# Patient Record
Sex: MALE | Race: BLACK OR AFRICAN AMERICAN | Employment: UNEMPLOYED
[De-identification: names, ages, dates, MRNs, and addresses within clinical notes are randomized per-mention and may not be internally consistent; named-entity substitution may affect disease eponyms.]

---

## 2024-03-24 PROBLEM — J85.1 PNEUMONIA WITH LUNG ABSCESS (HCC): Status: ACTIVE | Noted: 2024-03-24

## 2024-05-15 ENCOUNTER — HOSPITAL ENCOUNTER (OUTPATIENT)
Facility: HOSPITAL | Age: 59
Setting detail: RECURRING SERIES
Discharge: HOME | End: 2024-05-18
Payer: COMMERCIAL

## 2024-05-15 PROCEDURE — 97166 OT EVAL MOD COMPLEX 45 MIN: CPT

## 2024-05-15 PROCEDURE — 97535 SELF CARE MNGMENT TRAINING: CPT

## 2024-05-15 PROCEDURE — 97110 THERAPEUTIC EXERCISES: CPT

## 2024-05-15 NOTE — PROGRESS NOTES
[facility] [place]     Plan of Care/Statement of Necessity for Occupational Therapy Services        Patient name: Deepa Quiles Start of Care: 5/15/2024   Referral source: Tiago Beal, APR* : 1965    Medical Diagnosis: Pain in left hand, Pain in right hand    Onset Date: 3/1/24   Treatment Diagnosis: Pain in left hand [M79.642]  Pain in right hand [M79.641]                                                 Prior Hospitalization: see medical history Provider#: [id number]   Medications: Verified on Patient Summary List   Insurance: Payor: [employer] / Plan: [employer] [place] / Product Type: *No Product type* /       Comorbidities: Pneumonia, CHF   Prior Level of Function: Pt had no previous limitations     The Plan of Care and following information is based on the information from the initial evaluation.  Assessment/ key information: Pt presents with bilateral upper extremity weakness following bout with pneumonia and CHF resulting in overall weakness and decreased function. Pt presents with bilateral shoulder weakness, wrist weakness, decreased  and pinch strength. Pt educated on prognosis, exercises to begin strengthening, and activity modifications to help with endurance. Pt is a great candidate for skilled OT services to regain functional pain free use of bilateral arms in hands to perform ADL/IADL activities     Evaluation Complexity:  History:  MEDIUM  Complexity : 1-2 comorbidities / personal factors will impact the outcome/ POC ; Examination:  MEDIUM Complexity : 3 Standardized tests and measures addressin body structure, function, activity limitation and / or participation in recreation  ;Presentation:  MEDIUM Complexity : Evolving with changing characteristics  ;Clinical Decision Making:  FOTO:   = MEDIUM Complexity : FOTO score of 26-74 FOTO score = an established functional score where 100 = no disability  Overall Complexity Rating: MEDIUM    Problem List: Pain effecting function,

## 2024-05-15 NOTE — PROGRESS NOTES
OT DAILY TREATMENT NOTE/HAND EVAL 10-18    Patient Name: Deepa Quiles    Date: 5/15/2024    : 1965  Insurance: Payor: [employer] / Plan: [employer] [place] / Product Type: *No Product type* /      Patient  verified yes     Visit #   Current / Total 1 12   Time   In / Out 940 1020   Pain   In / Out 0 0   Subjective Functional Status/Changes: See history below    Changes to:  Meds, Allergies, Med Hx, Sx Hx?  If yes, update Summary List no       TREATMENT AREA =  Pain in left hand [M79.642]  Pain in right hand [M79.641]    SUBJECTIVE  Pain Level (0-10 scale): 0  []constant []intermittent [x]improving []worsening []no change since onset    Any medication changes, allergies to medications, adverse drug reactions, diagnosis change, or new procedure performed?: [x] No    [] Yes (see summary sheet for update)  Subjective functional status/changes:     PLOF: Pt had no previous limitations; pt is a right hand dominant  Limitations to PLOF: ***  Mechanism of Injury: ***  Current symptoms/Complaints: ***  Previous Treatment/Compliance: ***  PMHx/Surgical Hx: ***  Work Hx: Pt works as forklift operative, 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 (see flow sheet as applicable)     Details if applicable:       31080 Self Care/Home Management (timed):  improve patient knowledge and understanding of pain reducing techniques, positioning, and posture/ergonomics  to improve patient's ability to

## 2024-05-22 ENCOUNTER — HOSPITAL ENCOUNTER (OUTPATIENT)
Facility: HOSPITAL | Age: 59
Setting detail: RECURRING SERIES
Discharge: HOME | End: 2024-05-25
Payer: COMMERCIAL

## 2024-05-22 PROCEDURE — 97110 THERAPEUTIC EXERCISES: CPT

## 2024-05-22 PROCEDURE — 97530 THERAPEUTIC ACTIVITIES: CPT

## 2024-05-22 PROCEDURE — 97112 NEUROMUSCULAR REEDUCATION: CPT

## 2024-05-22 PROCEDURE — 97535 SELF CARE MNGMENT TRAINING: CPT

## 2024-05-22 NOTE — PROGRESS NOTES
PHYSICAL / OCCUPATIONAL THERAPY - DAILY TREATMENT NOTE     Patient Name: Deepa Quiles    Date: 2024    : 1965  Insurance: Payor: [employer] / Plan: [employer] [place] / Product Type: *No Product type* /      Patient  verified Yes     Visit #   Current / Total 2 12   Time   In / Out 730 830    Pain   In / Out 3 3   Subjective Functional Status/Changes: \"I got some good exercise\"    Changes to:  Allergies, Med Hx, Sx Hx?   no       TREATMENT AREA =  Pain in left hand [M79.642]  Pain in right hand [M79.641]    OBJECTIVE        Therapeutic Procedures:  Tx Min Billable or 1:1 Min (if diff from Tx Min) Procedure, Rationale, Specifics   15  71227 Therapeutic Exercise (timed):  increase ROM, strength, coordination, balance, and proprioception to improve patient's ability to progress to PLOF and address remaining functional goals. (see flow sheet as applicable)    Details if applicable:       15  42537 Therapeutic Activity (timed):  use of dynamic activities replicating functional movements to increase ROM, strength, coordination, balance, and proprioception in order to improve patient's ability to progress to PLOF and address remaining functional goals.  (see flow sheet as applicable)    Details if applicable:     15  34959 Neuromuscular Re-Education (timed):  improve balance, coordination, kinesthetic sense, posture, core stability and proprioception to improve patient's ability to develop conscious control of individual muscles and awareness of position of extremities in order to progress to PLOF and address remaining functional goals. (see flow sheet as applicable)     Details if applicable:     15  52024 Self Care/Home Management (timed):  improve patient knowledge and understanding of positioning, posture/ergonomics, and activity modification  to improve patient's ability to progress to PLOF and address remaining functional goals.  (see flow sheet as applicable)    Details if applicable:          60  [facility] Totals

## 2024-06-05 ENCOUNTER — HOSPITAL ENCOUNTER (OUTPATIENT)
Facility: HOSPITAL | Age: 59
Setting detail: RECURRING SERIES
Discharge: HOME | End: 2024-06-08
Payer: COMMERCIAL

## 2024-06-05 PROCEDURE — 97530 THERAPEUTIC ACTIVITIES: CPT

## 2024-06-05 PROCEDURE — 97110 THERAPEUTIC EXERCISES: CPT

## 2024-06-05 NOTE — PROGRESS NOTES
warmup exercise on the arm ergometer. Followed with shoulder thera band resistance exercises in extension, adduction, and external/internal rotations. Continued with serratus wall slides, bicep/eccentric curls, flexbar pronation and supination exercises. Pt continues to progress with therapy and goals.    Patient will continue to benefit from skilled PT / OT services to modify and progress therapeutic interventions, analyze and address functional mobility deficits, analyze and address ROM deficits, analyze and address strength deficits, and analyze and cue for proper movement patterns to address functional deficits and attain remaining goals.    Progress toward goals / Updated goals:  []  See Progress Note/Recertification    STG:  3 Weeks   Pt will be able to demonstrate HEP with visual cues only and correct form  to improve knowledge and carry over of exercises and improve overall outcome   Eval status: educated upon evaluation and given handout   2. Pt will be able to improve bilateral  strength by 20# to improve functional /use of the hand/arms.   Eval status. Right: 60#   Left: 45#   3. Pt will be able to improve 3pt and 2pt pinch strength to over 10# in pain free range to   Eval status Right 3pt  4#   right 2pt: 6#     Left: 3pt  5#    Left 2pt 6#   4. Pt will be able to improve bilateral shoulder strength to 4+/5 levels to improve functional use and strength of the UE   Eval status:               Right            Left  Flex      3/5                3+/5   Abd        3/5            3+/5   ER        3/5            3+/5     IR         5/5           5/5  Ext            5/5        3/5      LT weeks   Pt will be able to stack 1-8 pound weights at shoulder height consecutively with no drops and no rest breaks on bilateral hands to improve functional endurance.   Eval status: Unable to perform   Pt will be able to improve bilateral wrist and forearm strength to 4+/5 levels to improve functional strength

## 2024-06-10 ENCOUNTER — HOSPITAL ENCOUNTER (OUTPATIENT)
Facility: HOSPITAL | Age: 59
Setting detail: RECURRING SERIES
Discharge: HOME | End: 2024-06-13
Payer: COMMERCIAL

## 2024-06-10 PROCEDURE — 97110 THERAPEUTIC EXERCISES: CPT

## 2024-06-10 PROCEDURE — 97530 THERAPEUTIC ACTIVITIES: CPT

## 2024-06-10 NOTE — PROGRESS NOTES
PHYSICAL / OCCUPATIONAL THERAPY - DAILY TREATMENT NOTE     Patient Name: Deepa Quiles    Date: 6/10/2024    : 1965  Insurance: Payor: [employer] / Plan: [employer] [place] / Product Type: *No Product type* /      Patient  verified Yes    Visit #   Current / Total 4 12   Time   In / Out 9:10 am 10:10 am   Pain   In / Out 1  1     Subjective Functional Status/Changes: \"My wrist been a little stiff past week and I was practicing DJ \"   Changes to:  Allergies, Med Hx, Sx Hx?    No       TREATMENT AREA =  Pain in left hand [M79.642]  Pain in right hand [M79.641]    OBJECTIVE      Therapeutic Procedures:  Tx Min Billable or 1:1 Min (if diff from Tx Min) Procedure, Rationale, Specifics   30 10 88530 Therapeutic Exercise (timed):  increase ROM, strength, coordination, balance, and proprioception to improve patient's ability to progress to PLOF and address remaining functional goals. (see flow sheet as applicable)    Details if applicable:       30 30 73260 Therapeutic Activity (timed):  use of dynamic activities replicating functional movements to increase ROM, strength, coordination, balance, and proprioception in order to improve patient's ability to progress to PLOF and address remaining functional goals.  (see flow sheet as applicable)    Details if applicable:                    60 40 [facility] Totals Reminder: bill using total billable min of TIMED therapeutic procedures (example: do not include dry needle or estim unattended, both untimed codes, in totals to left)  8-22 min = 1 unit; 23-37 min = 2 units; 38-52 min = 3 units; 53-67 min = 4 units; 68-82 min = 5 units   Total Total     TOTAL TREATMENT TIME:        60     [x]  Patient Education billed concurrently with other procedures   [x] Review HEP    [] Progressed/Changed HEP, detail:    [] Other detail:       Objective Information/Functional Measures/Assessment    Pt presents with mild 1/10 stiffness and pain in both wrists. Pt performed  and pinch strengthening

## 2024-06-12 ENCOUNTER — HOSPITAL ENCOUNTER (OUTPATIENT)
Facility: HOSPITAL | Age: 59
Setting detail: RECURRING SERIES
Discharge: HOME | End: 2024-06-15
Payer: COMMERCIAL

## 2024-06-12 PROCEDURE — 97110 THERAPEUTIC EXERCISES: CPT

## 2024-06-12 PROCEDURE — 97530 THERAPEUTIC ACTIVITIES: CPT

## 2024-06-12 NOTE — PROGRESS NOTES
[facility]  [address] [place] [address]  Ph [phone]  Fx [phone]    Occupational Therapy Progress Note  Patient name: Deepa Quiles Start of Care: 5/15/2024   Referral source: Tiago Beal, APR* : 1965   Medical/Treatment Diagnosis: Pain in left hand [M79.642]  Pain in right hand [M79.641] Onset Date:3/1/2024   Prior Hospitalization: see medical history Provider#: [id number]   Medications: Verified on Patient Summary List    Comorbidities: Pneumonia, CHF  Prior Level of Function:Pt has no previous limitations    Visits from Start of Care: 5    Missed Visits: 0    Goals/Measure of Progress:    STG:  3 Weeks   Pt will be able to demonstrate HEP with visual cues only and correct form  to improve knowledge and carry over of exercises and improve overall outcome   Eval status: educated upon evaluation and given handout     2. Pt will be able to improve bilateral  strength by 20# to improve functional /use of the hand/arms.   Eval status. Right: 60#   Left: 45#   24:  Right 50#      Left: 56#    3. Pt will be able to improve 3pt and 2pt pinch strength to over 10# in pain free range to   Eval status Right 3pt  4#   right 2pt: 6#     Left: 3pt  5#    Left 2pt 6#   24: Right 3 pt 12 #   Right 2 pt 10#     Left 3 pt 14#   Left 2 pt 10#    4. Pt will be able to improve bilateral shoulder strength to 4+/5 levels to improve functional use and strength of the UE   Eval status:               Right            Left  Flex      3/5                3+/5   Abd        3/5            3+/5   ER        3/5            3+/5     IR         5/5           5/5  Ext            5/5        3/5     24                Right            Left  Flex      4+/5                4/5   Abd        4+/5            4/5   ER        4+/5            3+/5     IR         4+/5           4/5  Ext            4+/5        4/5        LT weeks   Pt will be able to stack 1-8 pound weights at

## 2024-06-12 NOTE — PROGRESS NOTES
PHYSICAL / OCCUPATIONAL THERAPY - DAILY TREATMENT NOTE     Patient Name: Deepa Quiles    Date: 2024    : 1965  Insurance: Payor: JANNET / Plan: [employer] [place] / Product Type: *No Product type* /      Patient  verified Yes     Visit #   Current / Total 5 12   Time   In / Out 8:35 am 9:13 am    Pain   In / Out 0 0   Subjective Functional Status/Changes: I definitely feel like I am getting better   Changes to:  Allergies, Med Hx, Sx Hx?    No     TREATMENT AREA =  Pain in left hand [M79.642]  Pain in right hand [M79.641]    OBJECTIVE    Therapeutic Procedures:  Tx Min Billable or 1:1 Min (if diff from Tx Min) Procedure, Rationale, Specifics   25  46274 Therapeutic Exercise (timed):  increase ROM, strength, coordination, balance, and proprioception to improve patient's ability to progress to PLOF and address remaining functional goals. (see flow sheet as applicable)    Details if applicable:       13  03138 Therapeutic Activity (timed):  use of dynamic activities replicating functional movements to increase ROM, strength, coordination, balance, and proprioception in order to improve patient's ability to progress to PLOF and address remaining functional goals.  (see flow sheet as applicable)    Details if applicable:                    38 min  [facility] Totals Reminder: bill using total billable min of TIMED therapeutic procedures (example: do not include dry needle or estim unattended, both untimed codes, in totals to left)  8-22 min = 1 unit; 23-37 min = 2 units; 38-52 min = 3 units; 53-67 min = 4 units; 68-82 min = 5 units   Total Total     TOTAL TREATMENT TIME:        38 min     [x]  Patient Education billed concurrently with other procedures   [x] Review HEP    [] Progressed/Changed HEP, detail:    [] Other detail:       Objective Information/Functional Measures/Assessment    Pt presents with no pain prior to therapy. Goals were reassessed and seen improvements in , 2pt, 3 pt, and shoulder/wrist MMT. Pt

## 2024-06-19 ENCOUNTER — HOSPITAL ENCOUNTER (OUTPATIENT)
Facility: HOSPITAL | Age: 59
Setting detail: RECURRING SERIES
Discharge: HOME | End: 2024-06-22
Payer: COMMERCIAL

## 2024-06-19 PROCEDURE — 97530 THERAPEUTIC ACTIVITIES: CPT

## 2024-06-19 PROCEDURE — 97112 NEUROMUSCULAR REEDUCATION: CPT

## 2024-06-19 PROCEDURE — 97110 THERAPEUTIC EXERCISES: CPT

## 2024-06-19 NOTE — PROGRESS NOTES
PHYSICAL / OCCUPATIONAL THERAPY - DAILY TREATMENT NOTE     Patient Name: Deepa Quiles    Date: 2024    : 1965  Insurance: Payor: JANNET / Plan: [employer] [place] / Product Type: *No Product type* /      Patient  verified Yes     Visit #   Current / Total 6 12   Time   In / Out 7:52am 8:50 am   Pain   In / Out 0 0   Subjective Functional Status/Changes: \"I have been feeling pretty good\"   Changes to:  Allergies, Med Hx, Sx Hx?     No     TREATMENT AREA =  Pain in left hand [M79.642]  Pain in right hand [M79.641]    OBJECTIVE      Therapeutic Procedures:  Tx Min Billable or 1:1 Min (if diff from Tx Min) Procedure, Rationale, Specifics   25  60548 Therapeutic Exercise (timed):  increase ROM, strength, coordination, balance, and proprioception to improve patient's ability to progress to PLOF and address remaining functional goals. (see flow sheet as applicable)    Details if applicable:       20  95181 Therapeutic Activity (timed):  use of dynamic activities replicating functional movements to increase ROM, strength, coordination, balance, and proprioception in order to improve patient's ability to progress to PLOF and address remaining functional goals.  (see flow sheet as applicable)    Details if applicable:     13  73411 Neuromuscular Re-Education (timed):  improve balance, coordination, kinesthetic sense, posture, core stability and proprioception to improve patient's ability to develop conscious control of individual muscles and awareness of position of extremities in order to progress to PLOF and address remaining functional goals. (see flow sheet as applicable)     Details if applicable:               58  [facility] Totals Reminder: bill using total billable min of TIMED therapeutic procedures (example: do not include dry needle or estim unattended, both untimed codes, in totals to left)  8-22 min = 1 unit; 23-37 min = 2 units; 38-52 min = 3 units; 53-67 min = 4 units; 68-82 min = 5 units   Total Total

## 2024-06-26 ENCOUNTER — HOSPITAL ENCOUNTER (OUTPATIENT)
Facility: HOSPITAL | Age: 59
Setting detail: RECURRING SERIES
Discharge: HOME | End: 2024-06-29
Payer: COMMERCIAL

## 2024-06-26 PROCEDURE — 97110 THERAPEUTIC EXERCISES: CPT

## 2024-06-26 PROCEDURE — 97112 NEUROMUSCULAR REEDUCATION: CPT

## 2024-06-26 PROCEDURE — 97530 THERAPEUTIC ACTIVITIES: CPT

## 2024-06-26 NOTE — PROGRESS NOTES
PHYSICAL / OCCUPATIONAL THERAPY - DAILY TREATMENT NOTE     Patient Name: Deepa Quiles    Date: 2024    : 1965  Insurance: Payor: JANNET / [place]: [employer] [place] / Product Type: *No Product type* /      Patient  verified Yes   Visit #   Current / Total 7 12   Time   In / Out 7:00am 7:40 am   Pain   In / Out 0 0   Subjective Functional Status/Changes: I am doing great   Changes to:  Allergies, Med Hx, Sx Hx?    No       TREATMENT AREA =  Pain in left hand [M79.642]  Pain in right hand [M79.641]    OBJECTIVE    Therapeutic Procedures:  Tx Min Billable or 1:1 Min (if diff from Tx Min) Procedure, Rationale, Specifics   20  07442 Therapeutic Exercise (timed):  increase ROM, strength, coordination, balance, and proprioception to improve patient's ability to progress to PLOF and address remaining functional goals. (see flow sheet as applicable)    Details if applicable:       10  54584 Therapeutic Activity (timed):  use of dynamic activities replicating functional movements to increase ROM, strength, coordination, balance, and proprioception in order to improve patient's ability to progress to PLOF and address remaining functional goals.  (see flow sheet as applicable)    Details if applicable:     10  57325 Neuromuscular Re-Education (timed):  improve balance, coordination, kinesthetic sense, posture, core stability and proprioception to improve patient's ability to develop conscious control of individual muscles and awareness of position of extremities in order to progress to PLOF and address remaining functional goals. (see flow sheet as applicable)     Details if applicable:               40  [facility] Totals Reminder: bill using total billable min of TIMED therapeutic procedures (example: do not include dry needle or estim unattended, both untimed codes, in totals to left)  8-22 min = 1 unit; 23-37 min = 2 units; 38-52 min = 3 units; 53-67 min = 4 units; 68-82 min = 5 units   Total Total     TOTAL TREATMENT TIME:

## 2024-07-17 ENCOUNTER — TELEPHONE (OUTPATIENT)
Facility: HOSPITAL | Age: 59
End: 2024-07-17

## 2024-07-17 NOTE — TELEPHONE ENCOUNTER
patient is waitng for dr to let him know if he should continue OT. Will call back next week to let us know.